# Patient Record
Sex: MALE | Race: WHITE | NOT HISPANIC OR LATINO | Employment: OTHER | URBAN - METROPOLITAN AREA
[De-identification: names, ages, dates, MRNs, and addresses within clinical notes are randomized per-mention and may not be internally consistent; named-entity substitution may affect disease eponyms.]

---

## 2019-03-08 ENCOUNTER — TELEPHONE (OUTPATIENT)
Dept: PODIATRY | Facility: CLINIC | Age: 74
End: 2019-03-08

## 2019-03-08 NOTE — TELEPHONE ENCOUNTER
----- Message from Melony Alfonso sent at 3/8/2019  9:09 AM CST -----  Type: Needs soon appointment    Who Called:  Patient  Best Call Back Number: [phone]  Additional Information: Patient has swollen and painful feet/first available appointment is next Thursday, March 14th/patient would like to be seen sooner if possible/please call patient back to reschedule or advise.

## 2019-03-13 ENCOUNTER — OFFICE VISIT (OUTPATIENT)
Dept: PODIATRY | Facility: CLINIC | Age: 74
End: 2019-03-13
Payer: MEDICARE

## 2019-03-13 ENCOUNTER — TELEPHONE (OUTPATIENT)
Dept: PODIATRY | Facility: CLINIC | Age: 74
End: 2019-03-13

## 2019-03-13 VITALS
HEART RATE: 76 BPM | WEIGHT: 165 LBS | HEIGHT: 69 IN | TEMPERATURE: 98 F | SYSTOLIC BLOOD PRESSURE: 149 MMHG | BODY MASS INDEX: 24.44 KG/M2 | DIASTOLIC BLOOD PRESSURE: 79 MMHG

## 2019-03-13 DIAGNOSIS — M25.571 BILATERAL ANKLE PAIN, UNSPECIFIED CHRONICITY: Primary | ICD-10-CM

## 2019-03-13 DIAGNOSIS — R60.0 EDEMA OF BOTH FEET: ICD-10-CM

## 2019-03-13 DIAGNOSIS — M25.572 BILATERAL ANKLE PAIN, UNSPECIFIED CHRONICITY: Primary | ICD-10-CM

## 2019-03-13 PROCEDURE — 99999 PR PBB SHADOW E&M-EST. PATIENT-LVL III: ICD-10-PCS | Mod: PBBFAC,,, | Performed by: PODIATRIST

## 2019-03-13 PROCEDURE — 99203 PR OFFICE/OUTPT VISIT, NEW, LEVL III, 30-44 MIN: ICD-10-PCS | Mod: S$PBB,,, | Performed by: PODIATRIST

## 2019-03-13 PROCEDURE — 99213 OFFICE O/P EST LOW 20 MIN: CPT | Mod: PBBFAC | Performed by: PODIATRIST

## 2019-03-13 PROCEDURE — 99203 OFFICE O/P NEW LOW 30 MIN: CPT | Mod: S$PBB,,, | Performed by: PODIATRIST

## 2019-03-13 PROCEDURE — 99999 PR PBB SHADOW E&M-EST. PATIENT-LVL III: CPT | Mod: PBBFAC,,, | Performed by: PODIATRIST

## 2019-03-13 RX ORDER — ESCITALOPRAM OXALATE 10 MG/1
10 TABLET ORAL DAILY
COMMUNITY

## 2019-03-13 RX ORDER — PRAVASTATIN SODIUM 20 MG/1
20 TABLET ORAL DAILY
COMMUNITY

## 2019-03-13 RX ORDER — OXCARBAZEPINE 150 MG/1
150 TABLET, FILM COATED ORAL 2 TIMES DAILY
COMMUNITY

## 2019-03-13 RX ORDER — LISINOPRIL 10 MG/1
10 TABLET ORAL DAILY
COMMUNITY

## 2019-03-13 NOTE — TELEPHONE ENCOUNTER
Advised pt we would see him today but if he was going to be more than 12 min late he would need to reschedule

## 2019-03-13 NOTE — TELEPHONE ENCOUNTER
----- Message from Julio César Roach sent at 3/13/2019  9:05 AM CDT -----  Pt maybe running a 10-15 minutes late for his appointment today. please call pt to advise. [phone]

## 2019-03-17 PROBLEM — M25.572 BILATERAL ANKLE PAIN: Status: ACTIVE | Noted: 2019-03-17

## 2019-03-17 PROBLEM — M25.571 BILATERAL ANKLE PAIN: Status: ACTIVE | Noted: 2019-03-17

## 2019-03-17 PROBLEM — R60.0 EDEMA OF BOTH FEET: Status: ACTIVE | Noted: 2019-03-17

## 2019-03-17 NOTE — PROGRESS NOTES
Subjective:       Patient ID: Kenny Russo is a 73 y.o. male.    Chief Complaint: Foot Pain (swelling in both feet and ankles); Foot Problem; and Ankle Pain   Patient presents with a complaint of swelling and pain in both feet and ankles he states this started 2-3 weeks ago.  Patient states he did start walking recently that is about the time that this started.  Patient relates it is not really painful but his legs and feet feel very tight.  Patient states he even has the swelling when he 1st gets up in the morning.  Patient relates no cardiac issues.  HPI  Review of Systems   Musculoskeletal: Positive for arthralgias and joint swelling.   All other systems reviewed and are negative.      Objective:      Physical Exam   Constitutional: He appears well-developed and well-nourished.   Cardiovascular:   Pulses:       Dorsalis pedis pulses are 2+ on the right side, and 2+ on the left side.        Posterior tibial pulses are 1+ on the right side, and 1+ on the left side.   Pulmonary/Chest: Effort normal.   Musculoskeletal: He exhibits edema and tenderness.   Feet:   Right Foot:   Protective Sensation: 4 sites tested. 4 sites sensed.   Neurological: He is alert.   Skin: Skin is warm. Capillary refill takes 2 to 3 seconds.   Psychiatric: He has a normal mood and affect. His behavior is normal. Judgment and thought content normal.   Nursing note and vitals reviewed.    patient is noted to have +2 pitting edema bilateral lower extremities this is especially noted from the dorsal aspect of the patient's feet to just superior to the ankle joint bilateral. Patient has significantly elevated arches both weight-bearing and nonweightbearing bilateral.  Assessment:       1. Bilateral ankle pain, unspecified chronicity    2. Edema of both feet        Plan:       A complete new patient evaluation was performed today I have discussed it with the patient the swelling in the tenderness he is having in both feet and ankles.  Patient  indicated he started walking recently for exercise ever since he started this walking has been having the swelling in both of his feet and ankles the patient does not have any cardiac issues.  Patient does have significantly elevated arches both weight-bearing and nonweightbearing bilateral I have advised the patient typically this can be caused by lack of appropriate support when he is walking I have initially recommended starting the patient out with some compressive lower leg supports some compressive stockings these were dispensed to him I want him to wear these when he gets up in the morning all day long take them off at night I am going to see him for follow-up in 3 weeks I want see how the patient is doing I will consider possibly arch supports for the patient to prevent this from becoming a problem in the future however the patient needs to get the swelling under control 1st patient swelling was +2 pitting.  Total face-to-face time equaled 30 min.  Patient was fit for appropriate compressive stockings.

## 2019-04-02 ENCOUNTER — OFFICE VISIT (OUTPATIENT)
Dept: PODIATRY | Facility: CLINIC | Age: 74
End: 2019-04-02
Payer: MEDICARE

## 2019-04-02 VITALS
TEMPERATURE: 98 F | DIASTOLIC BLOOD PRESSURE: 67 MMHG | BODY MASS INDEX: 24.44 KG/M2 | HEART RATE: 77 BPM | WEIGHT: 165 LBS | SYSTOLIC BLOOD PRESSURE: 148 MMHG | HEIGHT: 69 IN

## 2019-04-02 DIAGNOSIS — M25.572 BILATERAL ANKLE PAIN, UNSPECIFIED CHRONICITY: Primary | ICD-10-CM

## 2019-04-02 DIAGNOSIS — R60.0 EDEMA OF BOTH FEET: ICD-10-CM

## 2019-04-02 DIAGNOSIS — M25.571 BILATERAL ANKLE PAIN, UNSPECIFIED CHRONICITY: Primary | ICD-10-CM

## 2019-04-02 PROCEDURE — 99214 PR OFFICE/OUTPT VISIT, EST, LEVL IV, 30-39 MIN: ICD-10-PCS | Mod: S$PBB,,, | Performed by: PODIATRIST

## 2019-04-02 PROCEDURE — 99999 PR PBB SHADOW E&M-EST. PATIENT-LVL III: ICD-10-PCS | Mod: PBBFAC,,, | Performed by: PODIATRIST

## 2019-04-02 PROCEDURE — 99214 OFFICE O/P EST MOD 30 MIN: CPT | Mod: S$PBB,,, | Performed by: PODIATRIST

## 2019-04-02 PROCEDURE — 99999 PR PBB SHADOW E&M-EST. PATIENT-LVL III: CPT | Mod: PBBFAC,,, | Performed by: PODIATRIST

## 2019-04-02 PROCEDURE — 99213 OFFICE O/P EST LOW 20 MIN: CPT | Mod: PBBFAC,PN | Performed by: PODIATRIST

## 2019-04-07 NOTE — PROGRESS NOTES
Subjective:       Patient ID: Kenny Russo is a 73 y.o. male.    Chief Complaint: Follow-up and Foot Problem   patient states he is better that this should not helped quite a bit relieving his bilateral foot and ankle pain he is still having some degree of discomfort.  Patient has been wearing the stockings as directed he definitely has a lot less swelling.  HPI  Review of Systems   Musculoskeletal: Positive for arthralgias and joint swelling.   All other systems reviewed and are negative.      Objective:      Physical Exam   Constitutional: He appears well-developed and well-nourished.   Cardiovascular:   Pulses:       Dorsalis pedis pulses are 2+ on the right side, and 2+ on the left side.        Posterior tibial pulses are 1+ on the right side, and 1+ on the left side.   Pulmonary/Chest: Effort normal.   Musculoskeletal: He exhibits edema and tenderness.   Feet:   Right Foot:   Protective Sensation: 4 sites tested. 4 sites sensed.   Neurological: He is alert.   Skin: Skin is warm. Capillary refill takes 2 to 3 seconds.   Psychiatric: He has a normal mood and affect. His behavior is normal. Judgment and thought content normal.   Nursing note and vitals reviewed.    patient is noted to have +2 pitting edema bilateral lower extremities this is especially noted from the dorsal aspect of the patient's feet to just superior to the ankle joint bilateral. Patient has significantly elevated arches both weight-bearing and nonweightbearing bilateral.  Assessment:       1. Bilateral ankle pain, unspecified chronicity    2. Edema of both feet        Plan:       Following evaluation patient overall states he has about 30% better he states the shot helped quite a bit at relieving his discomfort and swelling the compression stockings are helping I have advised the patient ultimately I feel he is going to need appropriate support to relieve the inflammation and the swelling in both foot and ankle related to degenerative  changes by giving him the proper support this should make a significant difference patient dispensed diabetic insoles with blue arch pads to give him that appropriate support I want the patient to transition to using these insoles all the time for the 1st 2-3 days he is going to continue to use the stockings and the insole that he will discontinue the stockings just using the insole and will see how the patient does over period of several weeks.  Patient was advised to contact me with any problems questions or concerns I will plan to follow-up with him over several weeks to ensure that he is getting the proper amount of support thus relieving his swelling and discomfort in both foot and ankle.  Total face-to-face time including discussion evaluation treatment discussion of treatment plan and fitting the patient for proper diabetic insoles with appropriate support equaled 25 min.